# Patient Record
Sex: MALE | Race: WHITE | Employment: OTHER | URBAN - METROPOLITAN AREA
[De-identification: names, ages, dates, MRNs, and addresses within clinical notes are randomized per-mention and may not be internally consistent; named-entity substitution may affect disease eponyms.]

---

## 2020-10-06 ENCOUNTER — HOSPITAL ENCOUNTER (EMERGENCY)
Facility: CLINIC | Age: 55
Discharge: HOME | End: 2020-10-07
Attending: EMERGENCY MEDICINE | Admitting: EMERGENCY MEDICINE
Payer: COMMERCIAL

## 2020-10-06 DIAGNOSIS — R55 SYNCOPE, UNSPECIFIED SYNCOPE TYPE: ICD-10-CM

## 2020-10-06 DIAGNOSIS — E86.0 DEHYDRATION: ICD-10-CM

## 2020-10-06 DIAGNOSIS — E87.1 HYPONATREMIA: ICD-10-CM

## 2020-10-06 PROCEDURE — 96360 HYDRATION IV INFUSION INIT: CPT

## 2020-10-06 PROCEDURE — 93005 ELECTROCARDIOGRAM TRACING: CPT

## 2020-10-06 PROCEDURE — 86850 RBC ANTIBODY SCREEN: CPT | Performed by: EMERGENCY MEDICINE

## 2020-10-06 PROCEDURE — 84484 ASSAY OF TROPONIN QUANT: CPT | Performed by: EMERGENCY MEDICINE

## 2020-10-06 PROCEDURE — 99284 EMERGENCY DEPT VISIT MOD MDM: CPT | Mod: 25

## 2020-10-06 PROCEDURE — 86900 BLOOD TYPING SEROLOGIC ABO: CPT | Performed by: EMERGENCY MEDICINE

## 2020-10-06 PROCEDURE — 258N000003 HC RX IP 258 OP 636: Performed by: EMERGENCY MEDICINE

## 2020-10-06 PROCEDURE — 86901 BLOOD TYPING SEROLOGIC RH(D): CPT | Performed by: EMERGENCY MEDICINE

## 2020-10-06 PROCEDURE — 999N001017 HC STATISTIC GLUCOSE BY METER IP

## 2020-10-06 PROCEDURE — 80053 COMPREHEN METABOLIC PANEL: CPT | Performed by: EMERGENCY MEDICINE

## 2020-10-06 PROCEDURE — 85025 COMPLETE CBC W/AUTO DIFF WBC: CPT | Performed by: EMERGENCY MEDICINE

## 2020-10-06 RX ORDER — SODIUM CHLORIDE 9 MG/ML
INJECTION, SOLUTION INTRAVENOUS CONTINUOUS
Status: DISCONTINUED | OUTPATIENT
Start: 2020-10-07 | End: 2020-10-07 | Stop reason: HOSPADM

## 2020-10-06 RX ADMIN — SODIUM CHLORIDE 1000 ML: 9 INJECTION, SOLUTION INTRAVENOUS at 23:35

## 2020-10-06 RX ADMIN — SODIUM CHLORIDE 1000 ML: 9 INJECTION, SOLUTION INTRAVENOUS at 23:48

## 2020-10-06 ASSESSMENT — ENCOUNTER SYMPTOMS
SHORTNESS OF BREATH: 0
DIAPHORESIS: 1

## 2020-10-06 NOTE — ED AVS SNAPSHOT
[facility] Emergency Dept  [address] [facility] [address] [facility] [address]  Phone: [phone]  Fax: [phone]                                    José Luis Alonzo   MRN: [id number]    Department: [facility] Emergency Dept   Date of Visit: 10/6/2020           After Visit Summary Signature Page    I have received my discharge instructions, and my questions have been answered. I have discussed any challenges I see with this plan with the nurse or doctor.    ..........................................................................................................................................  Patient/Patient Representative Signature      ..........................................................................................................................................  Patient Representative Print Name and Relationship to Patient    ..................................................               ................................................  Date                                   Time    ..........................................................................................................................................  Reviewed by Signature/Title    ...................................................              ..............................................  Date                                               Time          22EPIC Rev 08/18

## 2020-10-07 VITALS
DIASTOLIC BLOOD PRESSURE: 80 MMHG | HEART RATE: 72 BPM | TEMPERATURE: 97.8 F | OXYGEN SATURATION: 100 % | RESPIRATION RATE: 21 BRPM | WEIGHT: 190 LBS | SYSTOLIC BLOOD PRESSURE: 126 MMHG

## 2020-10-07 LAB
ABO + RH BLD: NORMAL
ABO + RH BLD: NORMAL
ALBUMIN SERPL-MCNC: 4 G/DL (ref 3.4–5)
ALP SERPL-CCNC: 67 U/L (ref 40–150)
ALT SERPL W P-5'-P-CCNC: 28 U/L (ref 0–70)
ANION GAP SERPL CALCULATED.3IONS-SCNC: 9 MMOL/L (ref 3–14)
AST SERPL W P-5'-P-CCNC: 23 U/L (ref 0–45)
BASOPHILS # BLD AUTO: 0.1 10E9/L (ref 0–0.2)
BASOPHILS NFR BLD AUTO: 1 %
BILIRUB SERPL-MCNC: 0.8 MG/DL (ref 0.2–1.3)
BLD GP AB SCN SERPL QL: NORMAL
BLOOD BANK CMNT PATIENT-IMP: NORMAL
BUN SERPL-MCNC: 16 MG/DL (ref 7–30)
CALCIUM SERPL-MCNC: 8.4 MG/DL (ref 8.5–10.1)
CHLORIDE SERPL-SCNC: 98 MMOL/L (ref 94–109)
CO2 SERPL-SCNC: 23 MMOL/L (ref 20–32)
CREAT SERPL-MCNC: 1.26 MG/DL (ref 0.66–1.25)
EOSINOPHIL # BLD AUTO: 0.2 10E9/L (ref 0–0.7)
EOSINOPHIL NFR BLD AUTO: 2 %
ERYTHROCYTE [DISTWIDTH] IN BLOOD BY AUTOMATED COUNT: 11.7 % (ref 10–15)
GFR SERPL CREATININE-BSD FRML MDRD: 64 ML/MIN/{1.73_M2}
GLUCOSE BLDC GLUCOMTR-MCNC: 82 MG/DL (ref 70–99)
GLUCOSE SERPL-MCNC: 105 MG/DL (ref 70–99)
HCT VFR BLD AUTO: 38.3 % (ref 40–53)
HGB BLD-MCNC: 13.7 G/DL (ref 13.3–17.7)
INTERPRETATION ECG - MUSE: NORMAL
LYMPHOCYTES # BLD AUTO: 3.8 10E9/L (ref 0.8–5.3)
LYMPHOCYTES NFR BLD AUTO: 45 %
MCH RBC QN AUTO: 33.2 PG (ref 26.5–33)
MCHC RBC AUTO-ENTMCNC: 35.8 G/DL (ref 31.5–36.5)
MCV RBC AUTO: 93 FL (ref 78–100)
MONOCYTES # BLD AUTO: 0.6 10E9/L (ref 0–1.3)
MONOCYTES NFR BLD AUTO: 7 %
NEUTROPHILS # BLD AUTO: 3.8 10E9/L (ref 1.6–8.3)
NEUTROPHILS NFR BLD AUTO: 45 %
PLATELET # BLD AUTO: 304 10E9/L (ref 150–450)
POTASSIUM SERPL-SCNC: 3.4 MMOL/L (ref 3.4–5.3)
PROT SERPL-MCNC: 7 G/DL (ref 6.8–8.8)
RBC # BLD AUTO: 4.13 10E12/L (ref 4.4–5.9)
SODIUM SERPL-SCNC: 130 MMOL/L (ref 133–144)
SPECIMEN EXP DATE BLD: NORMAL
TROPONIN I SERPL-MCNC: <0.015 UG/L (ref 0–0.04)
WBC # BLD AUTO: 8.5 10E9/L (ref 4–11)

## 2020-10-07 NOTE — ED PROVIDER NOTES
History   Chief Complaint:  Syncope       HPI   José Luis Alonzo is a 54 year old male with history of hyperlipidemia who presents for evaluation of syncope. The patient reports he donated blood today, and says he didn't eat or drink enough but felt fine before. He reports he got home and he became diaphoretic, and had an unwitnessed syncopal episode. He says he doesn't believe he hit his head. His wife heard him fall, and went into the room to help him get up and upon standing had another syncopal event. His wife says this lasted around 1 minute. Due to these episodes he came to the emergency department, and his wife witnessed him have 2-3 more syncopal events while sitting in the chair. He says he exercise regularly, and has never had any chest pain with exercising. He isn't on any blood thinners, and takes no regular medications. He denei chest pain, shortness of breath, and bloody/black stools. He denies any known Covid-19 exposure.       Allergies:  Allergies have not been reviewed    Medications:   The patient is not currently taking any prescribed medications.     Past Medical History:    Basal cell carcinoma of cheek  Hyperlipidemia     Past Surgical History:    Tonsillectomy     Family History:    CAD    Social History:  Smoking status: Never Smoker  Smokeless Tobacco: Never Used  Alcohol use: Yes  PCP: Physician No Ref-Primary  Presents to the ED with wife      Review of Systems   Constitutional: Positive for diaphoresis.   Respiratory: Negative for shortness of breath.    Cardiovascular: Negative for chest pain.   Neurological: Positive for syncope.   All other systems reviewed and are negative.      Physical Exam     Patient Vitals for the past 24 hrs:   BP Temp Temp src Pulse Resp SpO2 Weight   10/07/20 0030 126/80 -- -- 72 21 100 % --   10/07/20 0020 134/83 -- -- 79 16 100 % --   10/07/20 0015 (!) 133/91 -- -- 91 23 97 % --   10/06/20 2340 123/83 -- -- 62 19 100 % --   10/06/20 2330 126/79 -- -- 61 11  98 % --   10/06/20 2312 96/61 97.8  F (36.6  C) Oral 69 16 98 % 86.2 kg (190 lb)       Physical Exam  General: Patient is alert and normal appearing.  HEENT: Head atraumatic    Eyes: pupils equal and reactive. Conjunctiva clear   Nares: patent   Oropharynx: no lesions, uvula midline, no palatal draping, normal voice, no trismus  Neck: Supple without lymphadenopathy, no meningismus  Chest: Heart regular rate and rhythm.   Lungs: Equal clear to auscultation with no wheeze or rales  Abdomen: Soft, non tender, nondistended, normal bowel sounds  Back: No costovertebral angle tenderness, no midline C, T or L spine tenderness  Neuro: Grossly nonfocal, normal speech, strength equal bilaterally, CN 2-12 intact, normal finger tap, normal heel-to-shin, no pronator drift, no visual field deficit, normal speech  Extremities: No deformities, equal radial and DP pulses. No clubbing, cyanosis.  No edema  Skin: Cool and diaphoretic and pale        Emergency Department Course     ECG:  Indication: syncope  Completed at 2334.  Read at 2335.   Normal sinus rhythm. Possible Left atrial enlargement. Left ventricular hypertrophy. Abnormal ECG.   Rate 67 bpm. MA interval 164. QRS duration 90. QT/QTc 450/475. P-R-T axes 29 1 1.     Laboratory:  Laboratory findings were communicated with the patient who voiced understanding of the findings.    CBC: WBC 8.5, HGB 13.7,    CMP:  (L), Glucose 105 (H), Creatinine 1.26 (H), Calcium 8.4 (L) o/w WNL.    Troponin (Collected 2333): <0.015   Glucose by meter: 82    Interventions:  NS Bolus 1,000mL IV   NS Bolus 1,000mL IV     Emergency Department Course:  Nursing notes and vitals reviewed.  1124: I performed an exam of the patient as documented above.     EKG obtained in the ED, see results above.       0003 Patient rechecked and updated.      0105 Patient rechecked and updated.      Findings and plan explained to the Patient. Patient discharged home with instructions regarding supportive  care, medications, and reasons to return. The importance of close follow-up was reviewed.   I personally reviewed the laboratory  results with the Patient and answered all related questions prior to discharge.    Impression & Plan        Medical Decision Making:  José Luis Alonzo is a 54 year old male who presents to the emergency department today for evaluation of 2 episodes of syncope.  Patient denies any pain.  He did have syncopal episode at home that he fell to the ground but denies headache or head injury as a result of this.  Patient does not use blood thinning medications.  He has no pain he states on arrival here in head to toe trauma exam without evidence of injury.  Patient states that he ran 2-1/2 miles this morning and exercises regularly without any decreased exercise tolerance or chest pain or shortness of breath.  He denies chest pain here and has a EKG without acute ischemic changes or dysrhythmia.  He was monitored for 2 hours in the emergency department without any dysrhythmia.  He did have multiple near syncopal episodes in triage and had one more with his wife on trying to stand up.  Patient does state he donated blood this afternoon and did not eat or drink much following this.  Patient was given 2 L of IV fluids here in the emergency department with improvement of his symptoms.  He was found to be mildly hyponatremic and have a mild increase of his creatinine likely related to dehydration.  Patient has no evidence of anemia at this point and no symptoms to suggest hemorrhage.  Patient tolerated p.o. here.  No evidence for stroke at this time.  He passed a road test with feeling back to his normal self and ambulating without difficulty.  He is recommended to follow-up with his primary care provider for repeat BMP next week.  I encouraged him to continue hydrating with electrolyte solution including either Pedialyte or Gatorade at home over the next day or 2.  He is encouraged to eat regular meals.   If he develops chest pain, strokelike symptoms, fever, further syncope or lightheadedness he is encouraged to return immediately to the emergency department.  Patient agreed with the plan as did his wife and all questions and concerns addressed.    Diagnosis:    ICD-10-CM    1. Syncope, unspecified syncope type  R55 ABO/Rh type and screen   2. Hyponatremia  E87.1    3. Dehydration  E86.0        Disposition:   Discharged to home.     Critical Care time was 30 minutes for this patient excluding procedures.       Scribe Disclosure:  ILisbet, am serving as a scribe at 12:35 AM on 10/7/2020 to document services personally performed by Danni Roche MD based on my observations and the provider's statements to me.      Lisbet Uribe  10/6/2020  [facility] EMERGENCY DEPARTMENT     Danni Roche MD  10/07/20 0231

## 2020-10-07 NOTE — ED TRIAGE NOTES
Pt reports he gave blood this afternoon. Pt reports he did not eat or drink enough. Pt reports fainting at home from a standing position to hard wood floors. Pt reports he lost consciousness. Unwitnessed, wife reports she heard him fall and found him on the floor and then reports pt passed out once more. EMS on scene evaluated pt. Pt and wife made decision to come through triage.

## 2020-10-07 NOTE — ED NOTES
Pt pale and diaphoretic in triage. Pt syncopal x2 in triage with RN and EDT at bedside. Pt immediately brought back to a room.

## 2020-10-07 NOTE — ED NOTES
Pt was able to ambulate without difficulty and reported that he was feeling at baseline. MD and RN notified.